# Patient Record
Sex: MALE | Race: ASIAN | Employment: OTHER | URBAN - METROPOLITAN AREA
[De-identification: names, ages, dates, MRNs, and addresses within clinical notes are randomized per-mention and may not be internally consistent; named-entity substitution may affect disease eponyms.]

---

## 2017-03-11 PROCEDURE — 88305 TISSUE EXAM BY PATHOLOGIST: CPT | Performed by: INTERNAL MEDICINE

## 2017-11-06 PROBLEM — E79.0 ELEVATED URIC ACID IN BLOOD: Status: ACTIVE | Noted: 2017-11-06

## 2017-11-06 PROBLEM — Z87.39 HISTORY OF GOUT: Status: ACTIVE | Noted: 2017-11-06

## 2017-11-09 PROBLEM — R73.03 PREDIABETES: Status: ACTIVE | Noted: 2017-11-09

## 2018-03-30 PROBLEM — R22.0 SCALP MASS: Status: ACTIVE | Noted: 2018-03-30

## 2018-03-30 PROBLEM — K60.2 ANAL FISSURE: Status: ACTIVE | Noted: 2018-03-30

## 2018-03-30 PROBLEM — K62.89 RECTAL PAIN: Status: ACTIVE | Noted: 2018-03-30

## 2018-03-30 PROBLEM — K62.5 RECTAL BLEEDING: Status: ACTIVE | Noted: 2018-03-30

## 2018-06-25 PROCEDURE — 88305 TISSUE EXAM BY PATHOLOGIST: CPT | Performed by: SURGERY

## 2018-07-10 PROBLEM — C79.2: Status: ACTIVE | Noted: 2018-07-10

## 2018-07-17 PROCEDURE — 88305 TISSUE EXAM BY PATHOLOGIST: CPT | Performed by: SURGERY

## 2019-02-26 PROBLEM — R22.0 SCALP MASS: Status: ACTIVE | Noted: 2019-02-26

## 2019-03-04 PROCEDURE — 88305 TISSUE EXAM BY PATHOLOGIST: CPT | Performed by: SURGERY

## 2020-07-22 PROBLEM — K40.90 LEFT INGUINAL HERNIA: Status: ACTIVE | Noted: 2020-07-22

## 2021-04-10 RX ORDER — METOPROLOL SUCCINATE 25 MG/1
25 TABLET, EXTENDED RELEASE ORAL DAILY
COMMUNITY
End: 2021-07-08

## 2021-04-11 ENCOUNTER — LAB ENCOUNTER (OUTPATIENT)
Dept: LAB | Facility: HOSPITAL | Age: 76
End: 2021-04-11
Attending: INTERNAL MEDICINE
Payer: MEDICARE

## 2021-04-11 DIAGNOSIS — Z01.818 PRE-OP TESTING: ICD-10-CM

## 2021-04-14 ENCOUNTER — HOSPITAL ENCOUNTER (OUTPATIENT)
Facility: HOSPITAL | Age: 76
Setting detail: HOSPITAL OUTPATIENT SURGERY
Discharge: HOME | End: 2021-04-14
Attending: INTERNAL MEDICINE | Admitting: INTERNAL MEDICINE
Payer: MEDICARE

## 2021-04-14 ENCOUNTER — ANESTHESIA EVENT (OUTPATIENT)
Dept: ENDOSCOPY | Facility: HOSPITAL | Age: 76
End: 2021-04-14
Payer: MEDICARE

## 2021-04-14 ENCOUNTER — ANESTHESIA (OUTPATIENT)
Dept: ENDOSCOPY | Facility: HOSPITAL | Age: 76
End: 2021-04-14
Payer: MEDICARE

## 2021-04-14 VITALS
DIASTOLIC BLOOD PRESSURE: 70 MMHG | RESPIRATION RATE: 15 BRPM | WEIGHT: 190 LBS | HEIGHT: 67 IN | BODY MASS INDEX: 29.82 KG/M2 | SYSTOLIC BLOOD PRESSURE: 110 MMHG | OXYGEN SATURATION: 98 % | TEMPERATURE: 97 F | HEART RATE: 73 BPM

## 2021-04-14 DIAGNOSIS — Z01.818 PRE-OP TESTING: Primary | ICD-10-CM

## 2021-04-14 DIAGNOSIS — Z86.010 HISTORY OF COLON POLYPS: ICD-10-CM

## 2021-04-14 PROCEDURE — 0DBL8ZX EXCISION OF TRANSVERSE COLON, VIA NATURAL OR ARTIFICIAL OPENING ENDOSCOPIC, DIAGNOSTIC: ICD-10-PCS | Performed by: INTERNAL MEDICINE

## 2021-04-14 PROCEDURE — 88305 TISSUE EXAM BY PATHOLOGIST: CPT | Performed by: INTERNAL MEDICINE

## 2021-04-14 PROCEDURE — 0DBK8ZX EXCISION OF ASCENDING COLON, VIA NATURAL OR ARTIFICIAL OPENING ENDOSCOPIC, DIAGNOSTIC: ICD-10-PCS | Performed by: INTERNAL MEDICINE

## 2021-04-14 RX ORDER — SODIUM CHLORIDE, SODIUM LACTATE, POTASSIUM CHLORIDE, CALCIUM CHLORIDE 600; 310; 30; 20 MG/100ML; MG/100ML; MG/100ML; MG/100ML
INJECTION, SOLUTION INTRAVENOUS CONTINUOUS
Status: DISCONTINUED | OUTPATIENT
Start: 2021-04-14 | End: 2021-04-14

## 2021-04-14 RX ORDER — PHENYLEPHRINE HCL 10 MG/ML
VIAL (ML) INJECTION AS NEEDED
Status: DISCONTINUED | OUTPATIENT
Start: 2021-04-14 | End: 2021-04-14 | Stop reason: SURG

## 2021-04-14 RX ORDER — LIDOCAINE HYDROCHLORIDE 10 MG/ML
INJECTION, SOLUTION EPIDURAL; INFILTRATION; INTRACAUDAL; PERINEURAL AS NEEDED
Status: DISCONTINUED | OUTPATIENT
Start: 2021-04-14 | End: 2021-04-14 | Stop reason: SURG

## 2021-04-14 RX ORDER — NALOXONE HYDROCHLORIDE 0.4 MG/ML
80 INJECTION, SOLUTION INTRAMUSCULAR; INTRAVENOUS; SUBCUTANEOUS AS NEEDED
Status: DISCONTINUED | OUTPATIENT
Start: 2021-04-14 | End: 2021-04-14

## 2021-04-14 RX ADMIN — SODIUM CHLORIDE, SODIUM LACTATE, POTASSIUM CHLORIDE, CALCIUM CHLORIDE: 600; 310; 30; 20 INJECTION, SOLUTION INTRAVENOUS at 15:31:00

## 2021-04-14 RX ADMIN — PHENYLEPHRINE HCL 50 MCG: 10 MG/ML VIAL (ML) INJECTION at 15:37:00

## 2021-04-14 RX ADMIN — LIDOCAINE HYDROCHLORIDE 50 MG: 10 INJECTION, SOLUTION EPIDURAL; INFILTRATION; INTRACAUDAL; PERINEURAL at 15:31:00

## 2021-04-14 RX ADMIN — SODIUM CHLORIDE, SODIUM LACTATE, POTASSIUM CHLORIDE, CALCIUM CHLORIDE: 600; 310; 30; 20 INJECTION, SOLUTION INTRAVENOUS at 15:45:00

## 2021-04-14 NOTE — ANESTHESIA POSTPROCEDURE EVALUATION
Patient: Dang Sarah.     Procedure Summary     Date: 04/14/21 Room / Location: [facility] ENDOSCOPY 01 / [facility]    Anesthesia Start: 9393 Anesthesia Stop: 7100    Procedure: COLONOSCOPY (N/A ) Diagnosis:       History of colon polyps      ([address]

## 2021-04-14 NOTE — OPERATIVE REPORT
[id number]  Louis Rosas.  11/17/1945 4/14/2021      Preoperative Diagnosis: Personal history of colon polyps.   Postoperative Diagnosis: Hemorrhoids, diverticulosis and colon polyps  Procedure Performed: Colonoscopy to the cecum and polypectomy    Liza scattered diverticuli in the right side of the colon was seen. At the anal verge the endoscope was retroverted and other than some small internal hemorrhoids, no other abnormalities were identified.  The procedure was tolerated well and upon completion a

## 2021-04-14 NOTE — ANESTHESIA PREPROCEDURE EVALUATION
Anesthesia PreOp Note    HPI:     Emre Ramos. is a 67year old male who presents for preoperative consultation requested by: Nicholas Tariq MD    Date of Surgery: 4/14/2021    Procedure(s):  COLONOSCOPY  Indication: History of colon polyps    Releva MD MADALYN at [address] [facility] Right 12/21/2011    Performed by Benoit Fuentes MD at [address] [facility] Right 12/21/2011    Performed by Benoit Fuentes MD at [address] [place] [address] lungs 4 (four) times daily as needed for Wheezing., Disp: 1 Inhaler, Rfl: 1  DiphenhydrAMINE HCl (BENADRYL) 25 MG Oral Cap, Take 25 mg by mouth daily as needed. , Disp: , Rfl: , 4/5/2021  OMEGA 3 1000 MG OR CAPS, 1  DAY, Disp: , Rfl:   nitroglycerin 0.4 % R and Sexual Activity      Alcohol use: Not Currently      Drug use: No      Sexual activity: Not Currently    Other Topics      Concerns:        Not on file    Social History Narrative      Not on file    Social Determinants of [address] Anesthesia Plan:   ASA:  2  Plan:   MAC  Informed Consent Plan and Risks Discussed With:  Patient and child/children  Discussed plan with:  Surgeon      I have informed Jackson Brod. and/or legal guardian or family member of the nature of th

## 2021-04-15 NOTE — PROGRESS NOTES
Please send off letter, patient not active on mychart. 4/15/2021  [address] [place] [id number]    Dear Duanemaurice Sepulveda,     The  biopsy/pathology findings from your colonoscopy showed:  1.   Colon polyps: a hyperplastic polyp whic

## 2021-04-16 NOTE — PROGRESS NOTES
Called and left VM to return call to review colonoscopy results. Sent via [employer] as well. Now active.

## 2021-07-23 PROBLEM — C79.2: Status: RESOLVED | Noted: 2018-07-10 | Resolved: 2021-07-23

## 2021-07-23 PROBLEM — K40.90 LEFT INGUINAL HERNIA: Status: RESOLVED | Noted: 2020-07-22 | Resolved: 2021-07-23

## 2021-07-23 PROBLEM — K60.2 ANAL FISSURE: Status: RESOLVED | Noted: 2018-03-30 | Resolved: 2021-07-23

## (undated) DEVICE — MEDI-VAC NON-CONDUCTIVE SUCTION TUBING 6MM X 1.8M (6FT.) L: Brand: CARDINAL HEALTH

## (undated) DEVICE — Device: Brand: DEFENDO AIR/WATER/SUCTION AND BIOPSY VALVE

## (undated) DEVICE — SNARE OPTMZ PLPCTM TRP

## (undated) DEVICE — SNARE 9MM 230CM 2.4MM EXACTO

## (undated) DEVICE — FORCEP RADIAL JAW 4

## (undated) DEVICE — Device: Brand: CUSTOM PROCEDURE KIT

## (undated) DEVICE — LINE MNTR ADLT SET O2 INTMD

## (undated) NOTE — LETTER
4/15/2021          [address] [place] [id number]    Dear Jadiel Noble,     The  biopsy/pathology findings from your colonoscopy showed:  1.   Colon polyps: a hyperplastic polyp which is a benign non-cancerous growth that was rem